# Patient Record
Sex: FEMALE | Race: WHITE | NOT HISPANIC OR LATINO | Employment: FULL TIME | URBAN - METROPOLITAN AREA
[De-identification: names, ages, dates, MRNs, and addresses within clinical notes are randomized per-mention and may not be internally consistent; named-entity substitution may affect disease eponyms.]

---

## 2023-08-09 DIAGNOSIS — F32.A DEPRESSION, UNSPECIFIED DEPRESSION TYPE: ICD-10-CM

## 2023-08-09 RX ORDER — SERTRALINE HYDROCHLORIDE 50 MG/1
50 TABLET, FILM COATED ORAL DAILY
COMMUNITY
Start: 2017-06-09 | End: 2023-08-09 | Stop reason: SDUPTHER

## 2023-08-09 RX ORDER — SERTRALINE HYDROCHLORIDE 50 MG/1
50 TABLET, FILM COATED ORAL DAILY
Qty: 30 TABLET | Refills: 0 | Status: SHIPPED | OUTPATIENT
Start: 2023-08-09 | End: 2023-10-17 | Stop reason: SDUPTHER

## 2023-10-09 DIAGNOSIS — B00.9 HSV-2 INFECTION: ICD-10-CM

## 2023-10-09 RX ORDER — ACYCLOVIR 800 MG/1
800 TABLET ORAL DAILY
Qty: 30 TABLET | Refills: 0 | Status: SHIPPED | OUTPATIENT
Start: 2023-10-09 | End: 2023-11-07 | Stop reason: ALTCHOICE

## 2023-10-09 RX ORDER — ACYCLOVIR 800 MG/1
1 TABLET ORAL DAILY
COMMUNITY
Start: 2020-02-13 | End: 2023-10-09 | Stop reason: SDUPTHER

## 2023-10-17 DIAGNOSIS — F32.A DEPRESSION, UNSPECIFIED DEPRESSION TYPE: ICD-10-CM

## 2023-10-17 RX ORDER — SERTRALINE HYDROCHLORIDE 50 MG/1
50 TABLET, FILM COATED ORAL DAILY
Qty: 30 TABLET | Refills: 0 | Status: SHIPPED | OUTPATIENT
Start: 2023-10-17 | End: 2023-11-07 | Stop reason: SDUPTHER

## 2023-11-07 ENCOUNTER — OFFICE VISIT (OUTPATIENT)
Dept: PRIMARY CARE | Facility: CLINIC | Age: 43
End: 2023-11-07
Payer: COMMERCIAL

## 2023-11-07 VITALS
SYSTOLIC BLOOD PRESSURE: 118 MMHG | DIASTOLIC BLOOD PRESSURE: 74 MMHG | WEIGHT: 178.4 LBS | HEIGHT: 69 IN | HEART RATE: 52 BPM | BODY MASS INDEX: 26.42 KG/M2

## 2023-11-07 DIAGNOSIS — M79.604 PAIN IN BOTH LOWER EXTREMITIES: Primary | ICD-10-CM

## 2023-11-07 DIAGNOSIS — M79.605 PAIN IN BOTH LOWER EXTREMITIES: Primary | ICD-10-CM

## 2023-11-07 DIAGNOSIS — B00.9 HSV-2 INFECTION: ICD-10-CM

## 2023-11-07 DIAGNOSIS — F32.A DEPRESSION, UNSPECIFIED DEPRESSION TYPE: ICD-10-CM

## 2023-11-07 PROBLEM — E55.9 VITAMIN D DEFICIENCY: Status: ACTIVE | Noted: 2023-11-07

## 2023-11-07 PROCEDURE — 99214 OFFICE O/P EST MOD 30 MIN: CPT | Performed by: STUDENT IN AN ORGANIZED HEALTH CARE EDUCATION/TRAINING PROGRAM

## 2023-11-07 RX ORDER — DOXYCYCLINE HYCLATE 20 MG
TABLET ORAL
COMMUNITY
Start: 2023-07-29

## 2023-11-07 RX ORDER — SPIRONOLACTONE 50 MG/1
50 TABLET, FILM COATED ORAL DAILY
COMMUNITY
Start: 2023-10-09

## 2023-11-07 RX ORDER — SERTRALINE HYDROCHLORIDE 50 MG/1
50 TABLET, FILM COATED ORAL DAILY
Qty: 90 TABLET | Refills: 1 | Status: SHIPPED | OUTPATIENT
Start: 2023-11-07

## 2023-11-07 RX ORDER — DROSPIRENONE AND ETHINYL ESTRADIOL 0.02-3(28)
1 KIT ORAL DAILY
COMMUNITY
Start: 2023-10-14

## 2023-11-07 RX ORDER — VALACYCLOVIR HYDROCHLORIDE 500 MG/1
500 TABLET, FILM COATED ORAL DAILY
Qty: 90 TABLET | Refills: 1 | Status: SHIPPED | OUTPATIENT
Start: 2023-11-07

## 2023-11-07 RX ORDER — ACYCLOVIR 800 MG/1
800 TABLET ORAL DAILY
Qty: 90 TABLET | Refills: 1 | Status: CANCELLED | OUTPATIENT
Start: 2023-11-07

## 2023-11-07 RX ORDER — TERCONAZOLE 4 MG/G
CREAM VAGINAL
COMMUNITY
Start: 2023-02-28

## 2023-11-07 NOTE — PROGRESS NOTES
Subjective   Patient ID: France Damico is a 43 y.o. female who presents for Depression and HSV Infection.  Today she is accompanied by alone.     HPI  1.  Depression  Continue sertraline 50 mg daily for her signs/symptoms  Denies any HI/SI  Feels very well and requesting refills    2.  HSV infection  Has a history in the past being diagnosed with HSV-2 with some flareups occurring in the past  Usually that this occurred almost monthly ready for her cycle started  Continues now acyclovir daily and denies any recent flareups  Requesting to discuss a different medication such as valacyclovir    3.  Vitamin D deficiency  Has a history of vitamin D deficiency but this was normal earlier this year    4.  Leg pain  Notices bilateral leg pain in regards to her anterior legs  Wishes to discuss this briefly at today's visit  Denies any trauma    Current Outpatient Medications on File Prior to Visit   Medication Sig Dispense Refill    doxycycline (Periostat) 20 mg tablet TAKE 2 TABLETS BY MOUTH EVERY DAY AS NEEDED FOR FLARES      drospirenone-ethinyl estradioL (Loly, Gianvi) 3-0.02 mg tablet Take 1 tablet by mouth once daily.      spironolactone (Aldactone) 50 mg tablet Take 1 tablet (50 mg) by mouth once daily.      terconazole (Terazol 7) 0.4 % vaginal cream USE 1 APPLICATION EVERY DAY AT BEDTIME FOR 7 DAYS.      [DISCONTINUED] acyclovir (Zovirax) 800 mg tablet Take 1 tablet (800 mg) by mouth once daily. 30 tablet 0    [DISCONTINUED] sertraline (Zoloft) 50 mg tablet Take 1 tablet (50 mg) by mouth once daily. 30 tablet 0     No current facility-administered medications on file prior to visit.        No Known Allergies    Immunization History   Administered Date(s) Administered    Moderna SARS-CoV-2 Vaccination 02/24/2021, 03/22/2021         Review of Systems  All pertinent positive symptoms are included in the history of present illness.  All other systems have been reviewed and are negative and noncontributory to this  "patient's current ailments.     Objective   /74 (BP Location: Left arm, Patient Position: Sitting, BP Cuff Size: Adult)   Pulse 52   Ht 1.74 m (5' 8.5\")   Wt 80.9 kg (178 lb 6.4 oz)   BMI 26.73 kg/m²   BSA: 1.98 meters squared  No visits with results within 1 Month(s) from this visit.   Latest known visit with results is:   [facility]acy Encounter on 02/16/2023   Component Date Value Ref Range Status    WBC 02/16/2023 4.1 (L)  4.4 - 11.3 x10E9/L Final    RBC 02/16/2023 4.21  4.00 - 5.20 x10E12/L Final    Hemoglobin 02/16/2023 13.3  12.0 - 16.0 g/dL Final    Hematocrit 02/16/2023 39.9  36.0 - 46.0 % Final    MCV 02/16/2023 95  80 - 100 fL Final    MCHC 02/16/2023 33.3  32.0 - 36.0 g/dL Final    Platelets 02/16/2023 264  150 - 450 x10E9/L Final    RDW 02/16/2023 12.1  11.5 - 14.5 % Final    Neutrophils % 02/16/2023 64.5  40.0 - 80.0 % Final    Immature Granulocytes %, Automated 02/16/2023 0.2  0.0 - 0.9 % Final    Comment:  Immature Granulocyte Count (IG) includes promyelocytes,    myelocytes and metamyelocytes but does not include bands.   Percent differential counts (%) should be interpreted in the   context of the absolute cell counts (cells/L).      Lymphocytes % 02/16/2023 25.7  13.0 - 44.0 % Final    Monocytes % 02/16/2023 6.4  2.0 - 10.0 % Final    Eosinophils % 02/16/2023 2.0  0.0 - 6.0 % Final    Basophils % 02/16/2023 1.2  0.0 - 2.0 % Final    Neutrophils Absolute 02/16/2023 2.61  1.20 - 7.70 x10E9/L Final    Lymphocytes Absolute 02/16/2023 1.04 (L)  1.20 - 4.80 x10E9/L Final    Monocytes Absolute 02/16/2023 0.26  0.10 - 1.00 x10E9/L Final    Eosinophils Absolute 02/16/2023 0.08  0.00 - 0.70 x10E9/L Final    Basophils Absolute 02/16/2023 0.05  0.00 - 0.10 x10E9/L Final    Glucose 02/16/2023 94  74 - 99 mg/dL Final    Sodium 02/16/2023 139  136 - 145 mmol/L Final    Potassium 02/16/2023 4.4  3.5 - 5.3 mmol/L Final    Chloride 02/16/2023 105  98 - 107 mmol/L Final    Bicarbonate 02/16/2023 27  21 - 32 " mmol/L Final    Anion Gap 02/16/2023 11  10 - 20 mmol/L Final    Urea Nitrogen 02/16/2023 11  6 - 23 mg/dL Final    Creatinine 02/16/2023 0.79  0.50 - 1.05 mg/dL Final    GFR Female 02/16/2023 >90  >90 mL/min/1.73m2 Final    Comment:  CALCULATIONS OF ESTIMATED GFR ARE PERFORMED   USING THE 2021 CKD-EPI STUDY REFIT EQUATION   WITHOUT THE RACE VARIABLE FOR THE IDMS-TRACEABLE   CREATININE METHODS.    https://jasn.asnjournals.org/content/early/2021/09/22/ASN.6981269567      Calcium 02/16/2023 9.2  8.6 - 10.3 mg/dL Final    Albumin 02/16/2023 4.4  3.4 - 5.0 g/dL Final    Alkaline Phosphatase 02/16/2023 63  33 - 110 U/L Final    Total Protein 02/16/2023 6.9  6.4 - 8.2 g/dL Final    AST 02/16/2023 17  9 - 39 U/L Final    Total Bilirubin 02/16/2023 1.2  0.0 - 1.2 mg/dL Final    ALT (SGPT) 02/16/2023 11  7 - 45 U/L Final    Comment:  Patients treated with Sulfasalazine may generate    falsely decreased results for ALT.      Vitamin D, 25-Hydroxy 02/16/2023 42  ng/mL Final    Comment: .  DEFICIENCY:         < 20   NG/ML  INSUFFICIENCY:      20-29  NG/ML  SUFFICIENCY:         NG/ML    THIS ASSAY ACCURATELY QUANTIFIES THE SUM OF  VITAMIN D3, 25-HYDROXY AND VIT D2,25-HYDROXY.      Hepatitis C Ab 02/16/2023 NONREACTIVE  NONREACTIVE Final    Comment:  Results from patients taking biotin supplements or receiving   high-dose biotin therapy should be interpreted with caution   due to possible interference with this test. Providers may    contact their local laboratory for further information.      Cholesterol 02/16/2023 181  0 - 199 mg/dL Final    Comment: .      AGE      DESIRABLE   BORDERLINE HIGH   HIGH     0-19 Y     0 - 169       170 - 199     >/= 200    20-24 Y     0 - 189       190 - 224     >/= 225         >24 Y     0 - 199       200 - 239     >/= 240   **All ranges are based on fasting samples. Specific   therapeutic targets will vary based on patient-specific   cardiac risk.  .   Pediatric guidelines  reference:Pediatrics 2011, 128(S5).   Adult guidelines reference: NCEP ATPIII Guidelines,     MIN 2001, 258:2486-97  .   Venipuncture immediately after or during the    administration of Metamizole may lead to falsely   low results. Testing should be performed immediately   prior to Metamizole dosing.      HDL 02/16/2023 60.6  mg/dL Final    Comment: .      AGE      VERY LOW   LOW     NORMAL    HIGH       0-19 Y       < 35   < 40     40-45     ----    20-24 Y       ----   < 40       >45     ----      >24 Y       ----   < 40     40-60      >60  .      Cholesterol/HDL Ratio 02/16/2023 3.0   Final    Comment: REF VALUES  DESIRABLE  < 3.4  HIGH RISK  > 5.0      LDL 02/16/2023 84  0 - 99 mg/dL Final    Comment: .                           NEAR      BORD      AGE      DESIRABLE  OPTIMAL    HIGH     HIGH     VERY HIGH     0-19 Y     0 - 109     ---    110-129   >/= 130     ----    20-24 Y     0 - 119     ---    120-159   >/= 160     ----      >24 Y     0 -  99   100-129  130-159   160-189     >/=190  .      VLDL 02/16/2023 37  0 - 40 mg/dL Final    Triglycerides 02/16/2023 183 (H)  0 - 149 mg/dL Final    Comment: .      AGE      DESIRABLE   BORDERLINE HIGH   HIGH     VERY HIGH   0 D-90 D    19 - 174         ----         ----        ----  91 D- 9 Y     0 -  74        75 -  99     >/= 100      ----    10-19 Y     0 -  89        90 - 129     >/= 130      ----    20-24 Y     0 - 114       115 - 149     >/= 150      ----         >24 Y     0 - 149       150 - 199    200- 499    >/= 500  .   Venipuncture immediately after or during the    administration of Metamizole may lead to falsely   low results. Testing should be performed immediately   prior to Metamizole dosing.      TSH 02/16/2023 2.01  0.44 - 3.98 mIU/L Final    Comment:  TSH testing is performed using different testing    methodology at [facility] than at other    [facility]. Direct result comparisons should    only be made within the same  method.         Physical Exam  CONSTITUTIONAL - well nourished, well developed, looks like stated age, in no acute distress, not ill-appearing, and not tired appearing  SKIN - normal skin color and pigmentation, normal skin turgor without rash, lesions, or nodules visualized  HEAD - no trauma, normocephalic  EYES - normal external exam  CHEST -no distressed breathing, good effort  EXTREMITIES - no edema, no deformities  NEUROLOGICAL - normal balance, normal motor, no ataxia  PSYCHIATRIC - alert, pleasant and cordial, age-appropriate    Assessment/Plan   1.  Depression  Stable.  No change recommended this time  We will continue sertraline at 50 mg daily  Refill sent to your pharmacy    2.  HSV infection  It was discussed that we change you on valacyclovir  Continue this medication at 500 mg daily  Please notify me if any flares occur    3.  Vitamin D deficiency  We will continue monitoring yearly    4.  Leg pain  I recommend you increase your water intake and decrease any excessive alcohol/caffeine  It might be even wise to discuss lab work if this continues to be an issue

## 2023-12-07 ENCOUNTER — HOSPITAL ENCOUNTER (OUTPATIENT)
Dept: RADIOLOGY | Facility: HOSPITAL | Age: 43
Discharge: HOME | End: 2023-12-07
Payer: COMMERCIAL

## 2023-12-07 DIAGNOSIS — Z12.31 ENCOUNTER FOR SCREENING MAMMOGRAM FOR MALIGNANT NEOPLASM OF BREAST: ICD-10-CM

## 2023-12-07 PROCEDURE — 77063 BREAST TOMOSYNTHESIS BI: CPT | Mod: BILATERAL PROCEDURE | Performed by: RADIOLOGY

## 2023-12-07 PROCEDURE — 77067 SCR MAMMO BI INCL CAD: CPT | Mod: BILATERAL PROCEDURE | Performed by: RADIOLOGY

## 2023-12-07 PROCEDURE — 77067 SCR MAMMO BI INCL CAD: CPT

## 2024-05-02 ENCOUNTER — LAB REQUISITION (OUTPATIENT)
Dept: LAB | Facility: HOSPITAL | Age: 44
End: 2024-05-02
Payer: COMMERCIAL

## 2024-05-02 LAB — SARS-COV-2 RNA RESP QL NAA+PROBE: DETECTED

## 2024-05-02 PROCEDURE — 87635 SARS-COV-2 COVID-19 AMP PRB: CPT

## 2024-05-07 ENCOUNTER — APPOINTMENT (OUTPATIENT)
Dept: PRIMARY CARE | Facility: CLINIC | Age: 44
End: 2024-05-07
Payer: COMMERCIAL

## 2024-06-09 DIAGNOSIS — F32.A DEPRESSION, UNSPECIFIED DEPRESSION TYPE: ICD-10-CM

## 2024-06-09 DIAGNOSIS — B00.9 HSV-2 INFECTION: ICD-10-CM

## 2024-06-10 ENCOUNTER — APPOINTMENT (OUTPATIENT)
Dept: PRIMARY CARE | Facility: CLINIC | Age: 44
End: 2024-06-10
Payer: COMMERCIAL

## 2024-06-11 RX ORDER — SERTRALINE HYDROCHLORIDE 50 MG/1
50 TABLET, FILM COATED ORAL DAILY
Qty: 90 TABLET | Refills: 0 | Status: SHIPPED | OUTPATIENT
Start: 2024-06-11

## 2024-06-11 RX ORDER — VALACYCLOVIR HYDROCHLORIDE 500 MG/1
500 TABLET, FILM COATED ORAL DAILY
Qty: 90 TABLET | Refills: 0 | Status: SHIPPED | OUTPATIENT
Start: 2024-06-11

## 2024-06-22 ENCOUNTER — LAB REQUISITION (OUTPATIENT)
Dept: LAB | Facility: HOSPITAL | Age: 44
End: 2024-06-22
Payer: COMMERCIAL

## 2024-06-22 DIAGNOSIS — J02.9 ACUTE PHARYNGITIS, UNSPECIFIED: ICD-10-CM

## 2024-06-22 PROCEDURE — 87651 STREP A DNA AMP PROBE: CPT

## 2024-06-23 LAB — S PYO DNA THROAT QL NAA+PROBE: NOT DETECTED

## 2024-08-22 ENCOUNTER — APPOINTMENT (OUTPATIENT)
Dept: PRIMARY CARE | Facility: CLINIC | Age: 44
End: 2024-08-22
Payer: COMMERCIAL

## 2024-08-22 VITALS
DIASTOLIC BLOOD PRESSURE: 80 MMHG | BODY MASS INDEX: 25.77 KG/M2 | HEIGHT: 69 IN | HEART RATE: 69 BPM | OXYGEN SATURATION: 99 % | SYSTOLIC BLOOD PRESSURE: 112 MMHG | WEIGHT: 174 LBS

## 2024-08-22 DIAGNOSIS — M79.604 RIGHT LEG PAIN: ICD-10-CM

## 2024-08-22 DIAGNOSIS — L71.9 ROSACEA: ICD-10-CM

## 2024-08-22 DIAGNOSIS — F32.A DEPRESSION, UNSPECIFIED DEPRESSION TYPE: ICD-10-CM

## 2024-08-22 DIAGNOSIS — Z12.31 ENCOUNTER FOR SCREENING MAMMOGRAM FOR MALIGNANT NEOPLASM OF BREAST: ICD-10-CM

## 2024-08-22 DIAGNOSIS — Z00.00 HEALTHCARE MAINTENANCE: Primary | ICD-10-CM

## 2024-08-22 DIAGNOSIS — M54.50 RIGHT LOW BACK PAIN, UNSPECIFIED CHRONICITY, UNSPECIFIED WHETHER SCIATICA PRESENT: ICD-10-CM

## 2024-08-22 DIAGNOSIS — B00.9 HSV-2 INFECTION: ICD-10-CM

## 2024-08-22 DIAGNOSIS — E55.9 VITAMIN D DEFICIENCY: ICD-10-CM

## 2024-08-22 PROCEDURE — 1036F TOBACCO NON-USER: CPT | Performed by: STUDENT IN AN ORGANIZED HEALTH CARE EDUCATION/TRAINING PROGRAM

## 2024-08-22 PROCEDURE — 3008F BODY MASS INDEX DOCD: CPT | Performed by: STUDENT IN AN ORGANIZED HEALTH CARE EDUCATION/TRAINING PROGRAM

## 2024-08-22 PROCEDURE — 99396 PREV VISIT EST AGE 40-64: CPT | Performed by: STUDENT IN AN ORGANIZED HEALTH CARE EDUCATION/TRAINING PROGRAM

## 2024-08-22 PROCEDURE — 99214 OFFICE O/P EST MOD 30 MIN: CPT | Performed by: STUDENT IN AN ORGANIZED HEALTH CARE EDUCATION/TRAINING PROGRAM

## 2024-08-22 RX ORDER — SERTRALINE HYDROCHLORIDE 50 MG/1
50 TABLET, FILM COATED ORAL DAILY
Qty: 90 TABLET | Refills: 1 | Status: SHIPPED | OUTPATIENT
Start: 2024-08-22

## 2024-08-22 RX ORDER — VALACYCLOVIR HYDROCHLORIDE 500 MG/1
500 TABLET, FILM COATED ORAL DAILY
Qty: 90 TABLET | Refills: 1 | Status: SHIPPED | OUTPATIENT
Start: 2024-08-22

## 2024-08-22 RX ORDER — SPIRONOLACTONE 50 MG/1
50 TABLET, FILM COATED ORAL DAILY
Qty: 90 TABLET | Refills: 1 | Status: SHIPPED | OUTPATIENT
Start: 2024-08-22

## 2024-08-22 ASSESSMENT — PATIENT HEALTH QUESTIONNAIRE - PHQ9
2. FEELING DOWN, DEPRESSED OR HOPELESS: NOT AT ALL
1. LITTLE INTEREST OR PLEASURE IN DOING THINGS: NOT AT ALL
SUM OF ALL RESPONSES TO PHQ9 QUESTIONS 1 AND 2: 0

## 2024-08-22 NOTE — PROGRESS NOTES
Subjective   Patient ID: France Damico is a 44 y.o. female who presents for Annual Exam.  Today she is accompanied by accompanied by child.     HPI  Health Maintenance   Overall patient is doing well.   Immunization: Tdap 2023   COVID-19 vaccine UTD  Colon Cancer Screening: No family history  OB/GYN:  mammogram 12/2023 due this year  Diet: attempts to eat a well balanced diet   Exercise: attempts to exercise regularly   Tobacco: Denies use  EtOH: Rarely Socially     2.  Depression  Continue sertraline 50 mg daily for her signs/symptoms  Denies any HI/SI  Feels very well and requesting refills     3.  HSV infection  Has a history in the past being diagnosed with HSV-2 with some flareups occurring in the past  Usually that this occurred almost monthly ready for her cycle started  Continues now acyclovir daily and denies any recent flareups  Requesting refills     4.  Vitamin D deficiency  Has a history of vitamin D deficiency but within normal limits at 42 at lab work done 2/16/2023     5.  Leg pain  She has been having anterior leg pain on her right leg for the last year   She thinks this is caused by her back pain that she thinks was caused by lifting heavy wood  She is also having some right anterior hip pain   She describes the pain as throbbing/achy   Has tried stretching but has not helped   Wishes to discuss this further     6. Rosacea   Patient has been seeing dermatology for management of rosacea   But has been unable to find time to go to an appointment   Is managed with 50 mg spironolactone once daily   Requesting we take over this prescription         Current Outpatient Medications on File Prior to Visit   Medication Sig Dispense Refill    drospirenone-ethinyl estradioL (Loly, Gianvi) 3-0.02 mg tablet Take 1 tablet by mouth once daily.      terconazole (Terazol 7) 0.4 % vaginal cream USE 1 APPLICATION EVERY DAY AT BEDTIME FOR 7 DAYS.      [DISCONTINUED] doxycycline (Periostat) 20 mg tablet TAKE 2  "TABLETS BY MOUTH EVERY DAY AS NEEDED FOR FLARES      [DISCONTINUED] sertraline (Zoloft) 50 mg tablet TAKE ONE TABLET BY MOUTH ONCE DAILY 90 tablet 0    [DISCONTINUED] spironolactone (Aldactone) 50 mg tablet Take 1 tablet (50 mg) by mouth once daily.      [DISCONTINUED] valACYclovir (Valtrex) 500 mg tablet TAKE ONE TABLET BY MOUTH ONCE DAILY 90 tablet 0     No current facility-administered medications on file prior to visit.        No Known Allergies    Immunization History   Administered Date(s) Administered    Flu vaccine (IIV4), preservative free *Check age/dose* 10/14/2016    Hepatitis B vaccine, adult *Check Product/Dose* 07/27/2004, 08/27/2004, 12/27/2004    Influenza, live, intranasal 10/24/2006    MMR vaccine, subcutaneous (MMR II) 04/09/1992, 09/19/2023    Moderna SARS-CoV-2 Vaccination 02/24/2021, 03/22/2021    Novel influenza-H1N1-09, preservative-free 12/21/2009    PPD Test 07/27/2004, 08/03/2004    Td vaccine, age 7 years and older (TDVAX) 07/27/2004         Review of Systems  All pertinent positive symptoms are included in the history of present illness.  All other systems have been reviewed and are negative and noncontributory to this patient's current ailments.     Objective   /80 (BP Location: Left arm, Patient Position: Sitting, BP Cuff Size: Adult)   Pulse 69   Ht 1.74 m (5' 8.5\")   Wt 78.9 kg (174 lb)   SpO2 99%   BMI 26.07 kg/m²   BSA: 1.95 meters squared  No visits with results within 1 Month(s) from this visit.   Latest known visit with results is:   Lab Requisition on 06/22/2024   Component Date Value Ref Range Status    Group A Strep PCR 06/22/2024 Not Detected  Not Detected Final    This assay is an FDA-cleared, real-time PCR test for the qualitative detection of Group A Streptococcus bacterial DNA from throat swabs that have not undergone a nucleic acid extraction. Negative results do not require confirmation by culture.        Physical Exam  CONSTITUTIONAL - well nourished, well " developed, looks like stated age, in no acute distress, not ill-appearing, and not tired appearing  SKIN - normal skin color and pigmentation, normal skin turgor without rash, lesions, or nodules visualized  HEAD - no trauma, normocephalic  EYES - normal external exam  CHEST - clear to auscultation, no wheezing, no crackles and no rales, good effort  CARDIAC - regular rate and regular rhythm, no skipped beats, no murmur  EXTREMITIES - no edema, no deformities  NEUROLOGICAL - normal balance, normal motor, no ataxia  PSYCHIATRIC - alert, pleasant and cordial, age-appropriate      Assessment/Plan   Health Maintenance   Complete history and physical examination was performed  EKG was not done at this visit   Fasting blood work was ordered please get this done at your earliest convenience   We will notify you of test results once available and make treatment recommendations accordingly  Please continue following up with your OB/GYN for well woman needs  A requisition for screening mammogram was provided to you today to be done at your earliest mutual convenience  Please attempt to maintain a well-balanced diet and exercise regularly      2.  Depression  Stable.  No change recommended this time  We will continue sertraline at 50 mg daily  Refill sent to your pharmacy     3  HSV infection  Stable at this time  We will continue valacyclovir 500 mg   Refills sent to your pharmacy     4.  Vitamin D deficiency  We will continue monitoring yearly     5. Right leg pain  We discussed your right leg pain at length  We made an order for you to get a X-Ray done at your earliest convenience   We also made a referral to PT for you to start seeing     6. Rosacea   Refills of spironolactone were sent to your pharmacy     If you have any questions/concerns, please call our office.   Otherwise, we will see you at your next visit.

## 2024-08-29 ENCOUNTER — LAB (OUTPATIENT)
Dept: LAB | Facility: LAB | Age: 44
End: 2024-08-29
Payer: COMMERCIAL

## 2024-08-29 ENCOUNTER — HOSPITAL ENCOUNTER (OUTPATIENT)
Dept: RADIOLOGY | Facility: HOSPITAL | Age: 44
Discharge: HOME | End: 2024-08-29
Payer: COMMERCIAL

## 2024-08-29 DIAGNOSIS — Z00.00 HEALTHCARE MAINTENANCE: ICD-10-CM

## 2024-08-29 DIAGNOSIS — M79.604 RIGHT LEG PAIN: ICD-10-CM

## 2024-08-29 DIAGNOSIS — M54.50 RIGHT LOW BACK PAIN, UNSPECIFIED CHRONICITY, UNSPECIFIED WHETHER SCIATICA PRESENT: ICD-10-CM

## 2024-08-29 LAB
ALBUMIN SERPL BCP-MCNC: 4.4 G/DL (ref 3.4–5)
ALP SERPL-CCNC: 62 U/L (ref 33–110)
ALT SERPL W P-5'-P-CCNC: 16 U/L (ref 7–45)
ANION GAP SERPL CALC-SCNC: 13 MMOL/L (ref 10–20)
AST SERPL W P-5'-P-CCNC: 18 U/L (ref 9–39)
BASOPHILS # BLD AUTO: 0.03 X10*3/UL (ref 0–0.1)
BASOPHILS NFR BLD AUTO: 0.6 %
BILIRUB SERPL-MCNC: 1 MG/DL (ref 0–1.2)
BUN SERPL-MCNC: 10 MG/DL (ref 6–23)
CALCIUM SERPL-MCNC: 9.2 MG/DL (ref 8.6–10.3)
CHLORIDE SERPL-SCNC: 105 MMOL/L (ref 98–107)
CHOLEST SERPL-MCNC: 218 MG/DL (ref 0–199)
CHOLESTEROL/HDL RATIO: 3.3
CO2 SERPL-SCNC: 25 MMOL/L (ref 21–32)
CREAT SERPL-MCNC: 0.64 MG/DL (ref 0.5–1.05)
EGFRCR SERPLBLD CKD-EPI 2021: >90 ML/MIN/1.73M*2
EOSINOPHIL # BLD AUTO: 0.11 X10*3/UL (ref 0–0.7)
EOSINOPHIL NFR BLD AUTO: 2.2 %
ERYTHROCYTE [DISTWIDTH] IN BLOOD BY AUTOMATED COUNT: 12.2 % (ref 11.5–14.5)
EST. AVERAGE GLUCOSE BLD GHB EST-MCNC: 85 MG/DL
GLUCOSE SERPL-MCNC: 97 MG/DL (ref 74–99)
HBA1C MFR BLD: 4.6 %
HCT VFR BLD AUTO: 40 % (ref 36–46)
HDLC SERPL-MCNC: 65.9 MG/DL
HGB BLD-MCNC: 13.2 G/DL (ref 12–16)
HIV 1+2 AB+HIV1 P24 AG SERPL QL IA: NONREACTIVE
IMM GRANULOCYTES # BLD AUTO: 0.01 X10*3/UL (ref 0–0.7)
IMM GRANULOCYTES NFR BLD AUTO: 0.2 % (ref 0–0.9)
LDLC SERPL CALC-MCNC: 128 MG/DL
LYMPHOCYTES # BLD AUTO: 1.21 X10*3/UL (ref 1.2–4.8)
LYMPHOCYTES NFR BLD AUTO: 24.7 %
MCH RBC QN AUTO: 31.5 PG (ref 26–34)
MCHC RBC AUTO-ENTMCNC: 33 G/DL (ref 32–36)
MCV RBC AUTO: 96 FL (ref 80–100)
MONOCYTES # BLD AUTO: 0.34 X10*3/UL (ref 0.1–1)
MONOCYTES NFR BLD AUTO: 6.9 %
NEUTROPHILS # BLD AUTO: 3.2 X10*3/UL (ref 1.2–7.7)
NEUTROPHILS NFR BLD AUTO: 65.4 %
NON HDL CHOLESTEROL: 152 MG/DL (ref 0–149)
NRBC BLD-RTO: 0 /100 WBCS (ref 0–0)
PLATELET # BLD AUTO: 259 X10*3/UL (ref 150–450)
POTASSIUM SERPL-SCNC: 5 MMOL/L (ref 3.5–5.3)
PROT SERPL-MCNC: 6.7 G/DL (ref 6.4–8.2)
RBC # BLD AUTO: 4.19 X10*6/UL (ref 4–5.2)
SODIUM SERPL-SCNC: 138 MMOL/L (ref 136–145)
TRIGL SERPL-MCNC: 123 MG/DL (ref 0–149)
TSH SERPL-ACNC: 2.03 MIU/L (ref 0.44–3.98)
VLDL: 25 MG/DL (ref 0–40)
WBC # BLD AUTO: 4.9 X10*3/UL (ref 4.4–11.3)

## 2024-08-29 PROCEDURE — 73502 X-RAY EXAM HIP UNI 2-3 VIEWS: CPT | Mod: RT

## 2024-08-29 PROCEDURE — 36415 COLL VENOUS BLD VENIPUNCTURE: CPT

## 2024-08-29 PROCEDURE — 83036 HEMOGLOBIN GLYCOSYLATED A1C: CPT

## 2024-08-29 PROCEDURE — 87389 HIV-1 AG W/HIV-1&-2 AB AG IA: CPT

## 2024-08-29 PROCEDURE — 72110 X-RAY EXAM L-2 SPINE 4/>VWS: CPT

## 2024-08-29 PROCEDURE — 80061 LIPID PANEL: CPT

## 2024-08-29 PROCEDURE — 80053 COMPREHEN METABOLIC PANEL: CPT

## 2024-08-29 PROCEDURE — 85025 COMPLETE CBC W/AUTO DIFF WBC: CPT

## 2024-08-29 PROCEDURE — 84443 ASSAY THYROID STIM HORMONE: CPT

## 2024-08-30 NOTE — RESULT ENCOUNTER NOTE
Cholesterol did increase now up to 218, HDL 65, , triglycerides 123  I recommend diet and exercise at this time    HIV negative    Hemoglobin A1c within normal limits at 4.6%    Thyroid within normal limits    Sugar, kidneys, liver, electrolytes are all within normal limits    Complete blood cell count shows no anemia

## 2024-09-27 DIAGNOSIS — L71.9 ROSACEA: ICD-10-CM

## 2024-09-27 RX ORDER — DOXYCYCLINE HYCLATE 20 MG
20 TABLET ORAL DAILY
Qty: 90 TABLET | Refills: 0 | Status: SHIPPED | OUTPATIENT
Start: 2024-09-27

## 2024-09-27 RX ORDER — DOXYCYCLINE 100 MG/1
100 CAPSULE ORAL DAILY
Qty: 7 CAPSULE | Refills: 0 | Status: SHIPPED | OUTPATIENT
Start: 2024-09-27 | End: 2024-10-04

## 2024-11-18 ENCOUNTER — OFFICE VISIT (OUTPATIENT)
Dept: PRIMARY CARE | Facility: CLINIC | Age: 44
End: 2024-11-18
Payer: COMMERCIAL

## 2024-11-18 VITALS
SYSTOLIC BLOOD PRESSURE: 106 MMHG | DIASTOLIC BLOOD PRESSURE: 70 MMHG | TEMPERATURE: 98.3 F | OXYGEN SATURATION: 98 % | HEART RATE: 80 BPM

## 2024-11-18 DIAGNOSIS — R52 BODY ACHES: ICD-10-CM

## 2024-11-18 DIAGNOSIS — J06.9 ACUTE URI: Primary | ICD-10-CM

## 2024-11-18 DIAGNOSIS — R05.1 ACUTE COUGH: ICD-10-CM

## 2024-11-18 PROCEDURE — 1036F TOBACCO NON-USER: CPT | Performed by: STUDENT IN AN ORGANIZED HEALTH CARE EDUCATION/TRAINING PROGRAM

## 2024-11-18 PROCEDURE — 99214 OFFICE O/P EST MOD 30 MIN: CPT | Performed by: STUDENT IN AN ORGANIZED HEALTH CARE EDUCATION/TRAINING PROGRAM

## 2024-11-18 RX ORDER — GUAIFENESIN/DEXTROMETHORPHAN 100-10MG/5
10 SYRUP ORAL 3 TIMES DAILY PRN
Qty: 118 ML | Refills: 0 | Status: SHIPPED | OUTPATIENT
Start: 2024-11-18 | End: 2024-11-28

## 2024-11-18 RX ORDER — AMOXICILLIN AND CLAVULANATE POTASSIUM 875; 125 MG/1; MG/1
875 TABLET, FILM COATED ORAL 2 TIMES DAILY
Qty: 20 TABLET | Refills: 0 | Status: SHIPPED | OUTPATIENT
Start: 2024-11-18 | End: 2024-11-28

## 2024-11-18 ASSESSMENT — ENCOUNTER SYMPTOMS
COUGH: 1
FEVER: 1
CHILLS: 1
SWEATS: 1
WHEEZING: 1

## 2024-11-18 ASSESSMENT — PATIENT HEALTH QUESTIONNAIRE - PHQ9
2. FEELING DOWN, DEPRESSED OR HOPELESS: NOT AT ALL
SUM OF ALL RESPONSES TO PHQ9 QUESTIONS 1 AND 2: 0
1. LITTLE INTEREST OR PLEASURE IN DOING THINGS: NOT AT ALL

## 2024-11-18 NOTE — PROGRESS NOTES
Subjective   Patient ID: France Damico is a 44 y.o. female who presents for Cough and Generalized Body Aches.    Cough  This is a new problem. The current episode started in the past 7 days. The problem has been gradually improving. The problem occurs every few minutes. The cough is Productive of sputum. Associated symptoms include chills, a fever, sweats and wheezing. The symptoms are aggravated by lying down. Risk factors for lung disease include occupational exposure. There is no history of asthma.   Also patient complains of being tired and weak.  Denies any chest pain, shortness of breath, denies any lightheadedness or balance problem.  Denies any otalgia, sore throat, rhinorrhea or sinus pressure  Did in-home COVID test yesterday which was negative    Review of Systems   Constitutional:  Positive for chills and fever.   Respiratory:  Positive for cough and wheezing.        Objective   /70 (BP Location: Left arm, Patient Position: Sitting, BP Cuff Size: Adult)   Pulse 80   Temp 36.8 °C (98.3 °F)   SpO2 98%     Physical Exam  Constitutional:       General: She is not in acute distress.     Appearance: Normal appearance. She is normal weight. She is not ill-appearing.   HENT:      Head: Normocephalic and atraumatic.      Right Ear: External ear normal.      Left Ear: External ear normal.      Nose: Nose normal. No congestion or rhinorrhea.      Mouth/Throat:      Mouth: Mucous membranes are moist.      Pharynx: Oropharynx is clear. No oropharyngeal exudate or posterior oropharyngeal erythema.   Eyes:      General: No scleral icterus.     Extraocular Movements: Extraocular movements intact.      Conjunctiva/sclera: Conjunctivae normal.      Pupils: Pupils are equal, round, and reactive to light.   Cardiovascular:      Rate and Rhythm: Normal rate and regular rhythm.      Pulses: Normal pulses.      Heart sounds: Normal heart sounds. No murmur heard.  Pulmonary:      Effort: Pulmonary effort is normal.  No respiratory distress.      Breath sounds: Wheezing, rhonchi and rales present.      Comments: There is and inspiratory wheezing and rales, as well as occasional rhonchi scattered mostly on the basis of the lungs  Abdominal:      General: Abdomen is flat. Bowel sounds are normal.      Palpations: Abdomen is soft.      Tenderness: There is no abdominal tenderness. There is no guarding or rebound.   Musculoskeletal:         General: No deformity. Normal range of motion.      Right lower leg: No edema.      Left lower leg: No edema.   Skin:     General: Skin is warm and dry.      Capillary Refill: Capillary refill takes less than 2 seconds.      Findings: No lesion or rash.   Neurological:      General: No focal deficit present.      Mental Status: She is alert and oriented to person, place, and time. Mental status is at baseline.   Psychiatric:         Mood and Affect: Mood normal.         Behavior: Behavior normal.         Assessment/Plan   Diagnoses and all orders for this visit:  Acute URI  Acute cough  Body aches  -     amoxicillin-pot clavulanate (Augmentin) 875-125 mg tablet; Take 1 tablet (875 mg) by mouth 2 times a day for 10 days.  -     dextromethorphan-guaifenesin (Robitussin DM)  mg/5 mL oral liquid; Take 10 mL by mouth 3 times a day as needed for cough for up to 10 days.  -     XR chest 2 views; Future  Clinical presentation and physical exam are more consistent with concerns of community-acquired pneumonia.  Differential diagnosis include viral URI with mainly culprit being influenza virus since in-home COVID test was negative, considering false negatives as well.  Patient already taking doxycycline for her rosacea  We will prescribe a course of p.o. Augmentin for 10 days alongside with Robitussin.  Chest x-ray was ordered to confirm diagnosis, rule out other intrathoracic abnormalities.  Instructed to consume plenty of fluids and use Tylenol/Motrin as needed for pain/fever.  Educated to go to  the closest emergency department if at any point patient will experience increased shortness of breath or chest pain.    Thank you for letting us be a part of your care team.  Please call the office if you have further questions or concerns regarding your care    Grace Abdi MD  PGY2,  Resident

## 2025-03-25 ENCOUNTER — HOSPITAL ENCOUNTER (OUTPATIENT)
Dept: RADIOLOGY | Facility: HOSPITAL | Age: 45
Discharge: HOME | End: 2025-03-25
Payer: COMMERCIAL

## 2025-03-25 VITALS — WEIGHT: 174 LBS | BODY MASS INDEX: 25.77 KG/M2 | HEIGHT: 69 IN

## 2025-03-25 DIAGNOSIS — Z12.31 ENCOUNTER FOR SCREENING MAMMOGRAM FOR MALIGNANT NEOPLASM OF BREAST: ICD-10-CM

## 2025-03-25 PROCEDURE — 77067 SCR MAMMO BI INCL CAD: CPT

## 2025-03-25 PROCEDURE — 77063 BREAST TOMOSYNTHESIS BI: CPT | Performed by: RADIOLOGY

## 2025-03-25 PROCEDURE — 77067 SCR MAMMO BI INCL CAD: CPT | Performed by: RADIOLOGY

## 2025-04-01 ENCOUNTER — APPOINTMENT (OUTPATIENT)
Dept: PRIMARY CARE | Facility: CLINIC | Age: 45
End: 2025-04-01
Payer: COMMERCIAL

## 2025-04-01 VITALS
WEIGHT: 178.6 LBS | BODY MASS INDEX: 26.76 KG/M2 | OXYGEN SATURATION: 97 % | DIASTOLIC BLOOD PRESSURE: 74 MMHG | HEART RATE: 64 BPM | SYSTOLIC BLOOD PRESSURE: 112 MMHG

## 2025-04-01 DIAGNOSIS — Z13.1 SCREENING FOR DIABETES MELLITUS: ICD-10-CM

## 2025-04-01 DIAGNOSIS — Z13.29 SCREENING FOR THYROID DISORDER: ICD-10-CM

## 2025-04-01 DIAGNOSIS — E55.9 VITAMIN D DEFICIENCY: ICD-10-CM

## 2025-04-01 DIAGNOSIS — Z13.6 SCREENING FOR CARDIOVASCULAR CONDITION: ICD-10-CM

## 2025-04-01 DIAGNOSIS — Z13.0 SCREENING FOR BLOOD DISEASE: ICD-10-CM

## 2025-04-01 DIAGNOSIS — L71.9 ROSACEA: ICD-10-CM

## 2025-04-01 DIAGNOSIS — L65.9 HAIR LOSS: ICD-10-CM

## 2025-04-01 DIAGNOSIS — F32.A DEPRESSION, UNSPECIFIED DEPRESSION TYPE: Primary | ICD-10-CM

## 2025-04-01 DIAGNOSIS — Z12.11 ENCOUNTER FOR SCREENING COLONOSCOPY: ICD-10-CM

## 2025-04-01 PROCEDURE — 1036F TOBACCO NON-USER: CPT | Performed by: STUDENT IN AN ORGANIZED HEALTH CARE EDUCATION/TRAINING PROGRAM

## 2025-04-01 PROCEDURE — 99214 OFFICE O/P EST MOD 30 MIN: CPT | Performed by: STUDENT IN AN ORGANIZED HEALTH CARE EDUCATION/TRAINING PROGRAM

## 2025-04-01 RX ORDER — BUPROPION HYDROCHLORIDE 150 MG/1
150 TABLET ORAL EVERY MORNING
Qty: 30 TABLET | Refills: 1 | Status: SHIPPED | OUTPATIENT
Start: 2025-04-01 | End: 2025-05-31

## 2025-04-01 RX ORDER — SPIRONOLACTONE 100 MG/1
100 TABLET, FILM COATED ORAL DAILY
Qty: 90 TABLET | Refills: 1 | Status: SHIPPED | OUTPATIENT
Start: 2025-04-01

## 2025-04-01 NOTE — PROGRESS NOTES
Subjective   Patient ID: France Damico is a 45 y.o. female who presents for Alopecia.  Today she is accompanied by alone.     HPI    Depression  Was previously on sertraline 50 mg daily for her signs/symptoms, patient states that she has stopped discontinue herself from an about 1.5 months ago due to issues with libido.  Since then patient does feel she needs to be on a medication for depression, has been doing research and would like to discuss Wellbutrin today.  Patient denies any history of any anxiety, seizures, eating disorders.    2. Hair Loss/rosacea/vitamin D deficiency  Has been previously losing hair, but started to notice it more consistently in October.  States that now people are starting to notice that she has hair loss and that it is thinning.  Notes that she has been sick quite a few times this past year with pneumonia and COVID, and understands that that may be playing a role.  Wishing to discuss possible medications as well as blood work that can be done to help evaluate and treat her hair loss.  Patient does take spironolactone 50 mg once daily for her rosacea with improvement.  In addition has been known to have vitamin D deficiency in the past which associated with hair loss.            Current Outpatient Medications on File Prior to Visit   Medication Sig Dispense Refill    doxycycline (Periostat) 20 mg tablet Take 1 tablet (20 mg) by mouth once daily. Take with a full glass of water and do not lie down for at least 30 minutes after. 90 tablet 0    terconazole (Terazol 7) 0.4 % vaginal cream USE 1 APPLICATION EVERY DAY AT BEDTIME FOR 7 DAYS.      valACYclovir (Valtrex) 500 mg tablet Take 1 tablet (500 mg) by mouth once daily. 90 tablet 1    [DISCONTINUED] sertraline (Zoloft) 50 mg tablet Take 1 tablet (50 mg) by mouth once daily. 90 tablet 1    [DISCONTINUED] spironolactone (Aldactone) 50 mg tablet Take 1 tablet (50 mg) by mouth once daily. 90 tablet 1     No current facility-administered  medications on file prior to visit.        No Known Allergies    Immunization History   Administered Date(s) Administered    Flu vaccine (IIV4), preservative free *Check age/dose* 10/14/2016    Hepatitis B vaccine, adult *Check Product/Dose* 07/27/2004, 08/27/2004, 12/27/2004    Influenza, live, intranasal 10/24/2006    MMR vaccine, subcutaneous (MMR II) 04/09/1992, 09/19/2023    Moderna SARS-CoV-2 Vaccination 02/24/2021, 03/22/2021    Novel influenza-H1N1-09, preservative-free 12/21/2009    PPD Test 07/27/2004, 08/03/2004    Td vaccine, age 7 years and older (TDVAX) 07/27/2004         Review of Systems  All pertinent positive symptoms are included in the history of present illness.  All other systems have been reviewed and are negative and noncontributory to this patient's current ailments.     Objective   /74 (BP Location: Left arm, Patient Position: Sitting, BP Cuff Size: Adult)   Pulse 64   Wt 81 kg (178 lb 9.6 oz)   SpO2 97%   BMI 26.76 kg/m²   BSA: 1.98 meters squared  No visits with results within 1 Month(s) from this visit.   Latest known visit with results is:   Lab on 08/29/2024   Component Date Value Ref Range Status    Thyroid Stimulating Hormone 08/29/2024 2.03  0.44 - 3.98 mIU/L Final    Cholesterol 08/29/2024 218 (H)  0 - 199 mg/dL Final          Age      Desirable   Borderline High   High     0-19 Y     0 - 169       170 - 199     >/= 200    20-24 Y     0 - 189       190 - 224     >/= 225         >24 Y     0 - 199       200 - 239     >/= 240   **All ranges are based on fasting samples. Specific   therapeutic targets will vary based on patient-specific   cardiac risk.    Pediatric guidelines reference:Pediatrics 2011, 128(S5).Adult guidelines reference: NCEP ATPIII Guidelines,MIN 2001, 258:2486-97    Venipuncture immediately after or during the administration of Metamizole may lead to falsely low results. Testing should be performed immediately prior to Metamizole dosing.    HDL-Cholesterol  08/29/2024 65.9  mg/dL Final      Age       Very Low   Low     Normal    High    0-19 Y    < 35      < 40     40-45     ----  20-24 Y    ----     < 40      >45      ----        >24 Y      ----     < 40     40-60      >60      Cholesterol/HDL Ratio 08/29/2024 3.3   Final      Ref Values  Desirable  < 3.4  High Risk  > 5.0    LDL Calculated 08/29/2024 128 (H)  <=99 mg/dL Final                                Near   Borderline      AGE      Desirable  Optimal    High     High     Very High     0-19 Y     0 - 109     ---    110-129   >/= 130     ----    20-24 Y     0 - 119     ---    120-159   >/= 160     ----      >24 Y     0 -  99   100-129  130-159   160-189     >/=190      VLDL 08/29/2024 25  0 - 40 mg/dL Final    Triglycerides 08/29/2024 123  0 - 149 mg/dL Final       Age         Desirable   Borderline High   High     Very High   0 D-90 D    19 - 174         ----         ----        ----  91 D- 9 Y     0 -  74        75 -  99     >/= 100      ----    10-19 Y     0 -  89        90 - 129     >/= 130      ----    20-24 Y     0 - 114       115 - 149     >/= 150      ----         >24 Y     0 - 149       150 - 199    200- 499    >/= 500    Venipuncture immediately after or during the administration of Metamizole may lead to falsely low results. Testing should be performed immediately prior to Metamizole dosing.    Non HDL Cholesterol 08/29/2024 152 (H)  0 - 149 mg/dL Final          Age       Desirable   Borderline High   High     Very High     0-19 Y     0 - 119       120 - 144     >/= 145    >/= 160    20-24 Y     0 - 149       150 - 189     >/= 190      ----         >24 Y    30 mg/dL above LDL Cholesterol goal      Glucose 08/29/2024 97  74 - 99 mg/dL Final    Sodium 08/29/2024 138  136 - 145 mmol/L Final    Potassium 08/29/2024 5.0  3.5 - 5.3 mmol/L Final    Chloride 08/29/2024 105  98 - 107 mmol/L Final    Bicarbonate 08/29/2024 25  21 - 32 mmol/L Final    Anion Gap 08/29/2024 13  10 - 20 mmol/L Final    Urea Nitrogen  08/29/2024 10  6 - 23 mg/dL Final    Creatinine 08/29/2024 0.64  0.50 - 1.05 mg/dL Final    eGFR 08/29/2024 >90  >60 mL/min/1.73m*2 Final    Calculations of estimated GFR are performed using the 2021 CKD-EPI Study Refit equation without the race variable for the IDMS-Traceable creatinine methods.  https://jasn.asnjournals.org/content/early/2021/09/22/ASN.9988945110    Calcium 08/29/2024 9.2  8.6 - 10.3 mg/dL Final    Albumin 08/29/2024 4.4  3.4 - 5.0 g/dL Final    Alkaline Phosphatase 08/29/2024 62  33 - 110 U/L Final    Total Protein 08/29/2024 6.7  6.4 - 8.2 g/dL Final    AST 08/29/2024 18  9 - 39 U/L Final    Bilirubin, Total 08/29/2024 1.0  0.0 - 1.2 mg/dL Final    ALT 08/29/2024 16  7 - 45 U/L Final    Patients treated with Sulfasalazine may generate falsely decreased results for ALT.    WBC 08/29/2024 4.9  4.4 - 11.3 x10*3/uL Final    nRBC 08/29/2024 0.0  0.0 - 0.0 /100 WBCs Final    RBC 08/29/2024 4.19  4.00 - 5.20 x10*6/uL Final    Hemoglobin 08/29/2024 13.2  12.0 - 16.0 g/dL Final    Hematocrit 08/29/2024 40.0  36.0 - 46.0 % Final    MCV 08/29/2024 96  80 - 100 fL Final    MCH 08/29/2024 31.5  26.0 - 34.0 pg Final    MCHC 08/29/2024 33.0  32.0 - 36.0 g/dL Final    RDW 08/29/2024 12.2  11.5 - 14.5 % Final    Platelets 08/29/2024 259  150 - 450 x10*3/uL Final    Neutrophils % 08/29/2024 65.4  40.0 - 80.0 % Final    Immature Granulocytes %, Automated 08/29/2024 0.2  0.0 - 0.9 % Final    Immature Granulocyte Count (IG) includes promyelocytes, myelocytes and metamyelocytes but does not include bands. Percent differential counts (%) should be interpreted in the context of the absolute cell counts (cells/UL).    Lymphocytes % 08/29/2024 24.7  13.0 - 44.0 % Final    Monocytes % 08/29/2024 6.9  2.0 - 10.0 % Final    Eosinophils % 08/29/2024 2.2  0.0 - 6.0 % Final    Basophils % 08/29/2024 0.6  0.0 - 2.0 % Final    Neutrophils Absolute 08/29/2024 3.20  1.20 - 7.70 x10*3/uL Final    Percent differential counts (%)  should be interpreted in the context of the absolute cell counts (cells/uL).    Immature Granulocytes Absolute, Au* 08/29/2024 0.01  0.00 - 0.70 x10*3/uL Final    Lymphocytes Absolute 08/29/2024 1.21  1.20 - 4.80 x10*3/uL Final    Monocytes Absolute 08/29/2024 0.34  0.10 - 1.00 x10*3/uL Final    Eosinophils Absolute 08/29/2024 0.11  0.00 - 0.70 x10*3/uL Final    Basophils Absolute 08/29/2024 0.03  0.00 - 0.10 x10*3/uL Final    Hemoglobin A1C 08/29/2024 4.6  see below % Final    Estimated Average Glucose 08/29/2024 85  Not Established mg/dL Final    HIV 1/2 Antigen/Antibody Screen wi* 08/29/2024 Nonreactive  Nonreactive Final       Physical Exam  CONSTITUTIONAL - well nourished, well developed, looks like stated age, in no acute distress, not ill-appearing, and not tired appearing  SKIN - normal skin color and pigmentation, normal skin turgor without rash, lesions, or nodules visualized  HEAD - no trauma, normocephalic  EYES - normal external exam  LUNG - clear to auscultation, no wheezing, no crackles and no rales, good effort  CARDIAC - regular rate and regular rhythm, no skipped beats, no murmur  ABDOMEN - no organomegaly, soft, nontender, nondistended, normal bowel sounds  EXTREMITIES - no edema, no deformities  NEUROLOGICAL - normal balance, normal motor, no ataxia  PSYCHIATRIC - alert, pleasant and cordial, age-appropriate      Assessment/Plan   Depression  A prescription for Wellbutrin 150 mg has been sent to your pharmacy.  Discussed benefits versus side effect such as increased anxiety, and insomnia.  Patient does not have any history of eating disorders or seizures.  Advised patient to follow-up in 6 weeks to further evaluate her symptoms as well as discuss the efficacy and tolerability of this medication.    If patient does develop anxiety on this medication and her symptoms do not feel well-controlled we can consider adding an SSRI in addition to his Wellbutrin.    2. Hair Loss/rosacea/vitamin D  deficiency  Requisition for blood work has been placed, we will order a TSH, CBC, CMP, vitamin D, zinc, ferritin to evaluate for hair loss.  We will notify you of results and treatment recommendations accordingly.  We will also increase your spironolactone from 50 mg to 100 mg once daily so that in addition to treating for rosacea it will also help with your hair loss.  In addition we also discussed topical versus oral minoxidil as well as Nutrofol for hair loss which we may consider at patient's follow-up appointment.    3. Hyperlipidemia  Continue with diet and exercise  We will recheck your lipid panel and A1c.     4.  Colon cancer screening.  Colonoscopy has been ordered, please get to this at your earliest mutual convenience.  Will notify results and treatment recommendations accordingly.

## 2025-04-10 LAB
25(OH)D3+25(OH)D2 SERPL-MCNC: 29 NG/ML (ref 30–100)
ALBUMIN SERPL-MCNC: 5.1 G/DL (ref 3.6–5.1)
ALP SERPL-CCNC: 63 U/L (ref 31–125)
ALT SERPL-CCNC: 14 U/L (ref 6–29)
ANION GAP SERPL CALCULATED.4IONS-SCNC: 9 MMOL/L (CALC) (ref 7–17)
AST SERPL-CCNC: 18 U/L (ref 10–35)
BASOPHILS # BLD AUTO: 41 CELLS/UL (ref 0–200)
BASOPHILS NFR BLD AUTO: 0.8 %
BILIRUB SERPL-MCNC: 1.9 MG/DL (ref 0.2–1.2)
BUN SERPL-MCNC: 12 MG/DL (ref 7–25)
CALCIUM SERPL-MCNC: 10.1 MG/DL (ref 8.6–10.2)
CHLORIDE SERPL-SCNC: 102 MMOL/L (ref 98–110)
CHOLEST SERPL-MCNC: 227 MG/DL
CHOLEST/HDLC SERPL: 3.3 (CALC)
CO2 SERPL-SCNC: 26 MMOL/L (ref 20–32)
CREAT SERPL-MCNC: 0.76 MG/DL (ref 0.5–0.99)
EGFRCR SERPLBLD CKD-EPI 2021: 98 ML/MIN/1.73M2
EOSINOPHIL # BLD AUTO: 92 CELLS/UL (ref 15–500)
EOSINOPHIL NFR BLD AUTO: 1.8 %
ERYTHROCYTE [DISTWIDTH] IN BLOOD BY AUTOMATED COUNT: 12.5 % (ref 11–15)
EST. AVERAGE GLUCOSE BLD GHB EST-MCNC: 103 MG/DL
EST. AVERAGE GLUCOSE BLD GHB EST-SCNC: 5.7 MMOL/L
FERRITIN SERPL-MCNC: 10 NG/ML (ref 16–232)
GLUCOSE SERPL-MCNC: 98 MG/DL (ref 65–99)
HBA1C MFR BLD: 5.2 % OF TOTAL HGB
HCT VFR BLD AUTO: 41.1 % (ref 35–45)
HDLC SERPL-MCNC: 69 MG/DL
HGB BLD-MCNC: 14 G/DL (ref 11.7–15.5)
LDLC SERPL CALC-MCNC: 136 MG/DL (CALC)
LYMPHOCYTES # BLD AUTO: 1387 CELLS/UL (ref 850–3900)
LYMPHOCYTES NFR BLD AUTO: 27.2 %
MCH RBC QN AUTO: 31.6 PG (ref 27–33)
MCHC RBC AUTO-ENTMCNC: 34.1 G/DL (ref 32–36)
MCV RBC AUTO: 92.8 FL (ref 80–100)
MONOCYTES # BLD AUTO: 362 CELLS/UL (ref 200–950)
MONOCYTES NFR BLD AUTO: 7.1 %
NEUTROPHILS # BLD AUTO: 3218 CELLS/UL (ref 1500–7800)
NEUTROPHILS NFR BLD AUTO: 63.1 %
NONHDLC SERPL-MCNC: 158 MG/DL (CALC)
PLATELET # BLD AUTO: 305 THOUSAND/UL (ref 140–400)
PMV BLD REES-ECKER: 10.1 FL (ref 7.5–12.5)
POTASSIUM SERPL-SCNC: 4.8 MMOL/L (ref 3.5–5.3)
PROT SERPL-MCNC: 7.6 G/DL (ref 6.1–8.1)
RBC # BLD AUTO: 4.43 MILLION/UL (ref 3.8–5.1)
SODIUM SERPL-SCNC: 137 MMOL/L (ref 135–146)
TRIGL SERPL-MCNC: 113 MG/DL
TSH SERPL-ACNC: 1.94 MIU/L
WBC # BLD AUTO: 5.1 THOUSAND/UL (ref 3.8–10.8)
ZINC SERPL-MCNC: 79 MCG/DL (ref 60–130)

## 2025-04-26 DIAGNOSIS — B00.9 HSV-2 INFECTION: ICD-10-CM

## 2025-04-28 RX ORDER — VALACYCLOVIR HYDROCHLORIDE 500 MG/1
500 TABLET, FILM COATED ORAL DAILY
Qty: 90 TABLET | Refills: 0 | Status: SHIPPED | OUTPATIENT
Start: 2025-04-28

## 2025-05-06 ENCOUNTER — APPOINTMENT (OUTPATIENT)
Dept: PRIMARY CARE | Facility: CLINIC | Age: 45
End: 2025-05-06
Payer: COMMERCIAL

## 2025-05-06 VITALS
DIASTOLIC BLOOD PRESSURE: 72 MMHG | WEIGHT: 168.8 LBS | HEART RATE: 94 BPM | SYSTOLIC BLOOD PRESSURE: 108 MMHG | BODY MASS INDEX: 25 KG/M2 | HEIGHT: 69 IN | OXYGEN SATURATION: 98 %

## 2025-05-06 DIAGNOSIS — L21.0 DANDRUFF IN ADULT: Primary | ICD-10-CM

## 2025-05-06 DIAGNOSIS — L71.9 ROSACEA: ICD-10-CM

## 2025-05-06 DIAGNOSIS — L65.9 HAIR LOSS: ICD-10-CM

## 2025-05-06 DIAGNOSIS — B00.9 HSV-2 INFECTION: ICD-10-CM

## 2025-05-06 DIAGNOSIS — F32.A DEPRESSION, UNSPECIFIED DEPRESSION TYPE: ICD-10-CM

## 2025-05-06 PROCEDURE — 99214 OFFICE O/P EST MOD 30 MIN: CPT | Performed by: STUDENT IN AN ORGANIZED HEALTH CARE EDUCATION/TRAINING PROGRAM

## 2025-05-06 PROCEDURE — 3008F BODY MASS INDEX DOCD: CPT | Performed by: STUDENT IN AN ORGANIZED HEALTH CARE EDUCATION/TRAINING PROGRAM

## 2025-05-06 PROCEDURE — 1036F TOBACCO NON-USER: CPT | Performed by: STUDENT IN AN ORGANIZED HEALTH CARE EDUCATION/TRAINING PROGRAM

## 2025-05-06 RX ORDER — SPIRONOLACTONE 100 MG/1
100 TABLET, FILM COATED ORAL DAILY
Qty: 90 TABLET | Refills: 1 | Status: SHIPPED | OUTPATIENT
Start: 2025-05-06

## 2025-05-06 RX ORDER — VALACYCLOVIR HYDROCHLORIDE 500 MG/1
500 TABLET, FILM COATED ORAL DAILY
Qty: 90 TABLET | Refills: 3 | Status: SHIPPED | OUTPATIENT
Start: 2025-05-06

## 2025-05-06 RX ORDER — BUPROPION HYDROCHLORIDE 300 MG/1
300 TABLET ORAL EVERY MORNING
Qty: 90 TABLET | Refills: 1 | Status: SHIPPED | OUTPATIENT
Start: 2025-05-06

## 2025-05-06 RX ORDER — KETOCONAZOLE 20 MG/ML
SHAMPOO, SUSPENSION TOPICAL 2 TIMES WEEKLY
Qty: 120 ML | Refills: 1 | Status: SHIPPED | OUTPATIENT
Start: 2025-05-08

## 2025-05-06 NOTE — PROGRESS NOTES
Subjective   Patient ID: France Damico is a 45 y.o. female who presents for follow up   Today she is accompanied by alone.     HPI  Depression  Current medication is buPROPion XL (Wellbutrin XL) 150 mg 24 hr tablet   Has been tolerating the medication well, however does feel like her symptoms are not appropriately controlled.  Continues to feel overwhelmed which is making her emotional bowl and irritable.  Would like to discuss adjusting her medication.  Was last on Zoloft 50mg but discontinued due to decreased libido.   Denies any insomnia, increased anxiety on the current medication.  In addition, does not have any history of seizures or eating disorders.    2. Hair Loss/rosacea/vitamin D deficiency  Patient spironolactone was increased from 50 mg to 100 mg at her last visit, has been tolerating well.  Was previously on spironolactone 50 mcg for her rosacea with improvement, however with increased to 100 mg to help with her hair loss.  Blood work was placed and drawn for TSH, CBC, CMP, vitamin D, zinc, ferritin to evaluate for hair loss; all located within the chart.   Her vitamin D and ferritin levels did come back low, has started taking 50 mcg of vitamin D a day.    3.  HSV infection  Has a history in the past being diagnosed with HSV-2 with some flareups occurring in the past  Usually that this occurred almost monthly ready for her cycle started  Continues now acyclovir daily and denies any recent flareups  Requesting refills       Current Outpatient Medications on File Prior to Visit   Medication Sig Dispense Refill    doxycycline (Periostat) 20 mg tablet Take 1 tablet (20 mg) by mouth once daily. Take with a full glass of water and do not lie down for at least 30 minutes after. 90 tablet 0    terconazole (Terazol 7) 0.4 % vaginal cream USE 1 APPLICATION EVERY DAY AT BEDTIME FOR 7 DAYS.      [DISCONTINUED] buPROPion XL (Wellbutrin XL) 150 mg 24 hr tablet Take 1 tablet (150 mg) by mouth once daily in the  "morning. Do not crush, chew, or split. 30 tablet 1    [DISCONTINUED] spironolactone (Aldactone) 100 mg tablet Take 1 tablet (100 mg) by mouth once daily. 90 tablet 1    [DISCONTINUED] valACYclovir (Valtrex) 500 mg tablet TAKE ONE TABLET BY MOUTH once DAILY 90 tablet 0     No current facility-administered medications on file prior to visit.        No Known Allergies    Immunization History   Administered Date(s) Administered    Flu vaccine (IIV4), preservative free *Check age/dose* 10/14/2016    Hepatitis B vaccine, adult *Check Product/Dose* 07/27/2004, 08/27/2004, 12/27/2004    Influenza, live, intranasal 10/24/2006    MMR vaccine, subcutaneous (MMR II) 04/09/1992, 09/19/2023    Moderna SARS-CoV-2 Vaccination 02/24/2021, 03/22/2021    Novel influenza-H1N1-09, preservative-free 12/21/2009    PPD Test 07/27/2004, 08/03/2004    Td vaccine, age 7 years and older (TDVAX) 07/27/2004         Review of Systems  All pertinent positive symptoms are included in the history of present illness.  All other systems have been reviewed and are negative and noncontributory to this patient's current ailments.     Objective   /72 (BP Location: Left arm, Patient Position: Sitting, BP Cuff Size: Adult)   Pulse 94   Ht 1.74 m (5' 8.5\")   Wt 76.6 kg (168 lb 12.8 oz)   SpO2 98%   BMI 25.29 kg/m²   BSA: 1.92 meters squared  No visits with results within 1 Month(s) from this visit.   Latest known visit with results is:   Office Visit on 04/01/2025   Component Date Value Ref Range Status    TSH W/REFLEX TO FT4 04/08/2025 1.94  mIU/L Final    Comment:           Reference Range                         > or = 20 Years  0.40-4.50                              Pregnancy Ranges            First trimester    0.26-2.66            Second trimester   0.55-2.73            Third trimester    0.43-2.91      CHOLESTEROL, TOTAL 04/08/2025 227 (H)  <200 mg/dL Final    HDL CHOLESTEROL 04/08/2025 69  > OR = 50 mg/dL Final    TRIGLYCERIDES 04/08/2025 " 113  <150 mg/dL Final    LDL-CHOLESTEROL 04/08/2025 136 (H)  mg/dL (calc) Final    Comment: Reference range: <100     Desirable range <100 mg/dL for primary prevention;    <70 mg/dL for patients with CHD or diabetic patients   with > or = 2 CHD risk factors.     LDL-C is now calculated using the Carter-Gruber   calculation, which is a validated novel method providing   better accuracy than the Friedewald equation in the   estimation of LDL-C.   Carter RADER et al. MIN. 2013;310(19): 8284-9477   (http://education.Cubeit.fm/faq/TPV605)      CHOL/HDLC RATIO 04/08/2025 3.3  <5.0 (calc) Final    NON HDL CHOLESTEROL 04/08/2025 158 (H)  <130 mg/dL (calc) Final    Comment: For patients with diabetes plus 1 major ASCVD risk   factor, treating to a non-HDL-C goal of <100 mg/dL   (LDL-C of <70 mg/dL) is considered a therapeutic   option.      GLUCOSE 04/08/2025 98  65 - 99 mg/dL Final    Comment:               Fasting reference interval         UREA NITROGEN (BUN) 04/08/2025 12  7 - 25 mg/dL Final    CREATININE 04/08/2025 0.76  0.50 - 0.99 mg/dL Final    EGFR 04/08/2025 98  > OR = 60 mL/min/1.73m2 Final    SODIUM 04/08/2025 137  135 - 146 mmol/L Final    POTASSIUM 04/08/2025 4.8  3.5 - 5.3 mmol/L Final    CHLORIDE 04/08/2025 102  98 - 110 mmol/L Final    CARBON DIOXIDE 04/08/2025 26  20 - 32 mmol/L Final    ELECTROLYTE BALANCE 04/08/2025 9  7 - 17 mmol/L (calc) Final    CALCIUM 04/08/2025 10.1  8.6 - 10.2 mg/dL Final    PROTEIN, TOTAL 04/08/2025 7.6  6.1 - 8.1 g/dL Final    ALBUMIN 04/08/2025 5.1  3.6 - 5.1 g/dL Final    BILIRUBIN, TOTAL 04/08/2025 1.9 (H)  0.2 - 1.2 mg/dL Final    ALKALINE PHOSPHATASE 04/08/2025 63  31 - 125 U/L Final    AST 04/08/2025 18  10 - 35 U/L Final    ALT 04/08/2025 14  6 - 29 U/L Final    WHITE BLOOD CELL COUNT 04/08/2025 5.1  3.8 - 10.8 Thousand/uL Final    RED BLOOD CELL COUNT 04/08/2025 4.43  3.80 - 5.10 Million/uL Final    HEMOGLOBIN 04/08/2025 14.0  11.7 - 15.5 g/dL Final     HEMATOCRIT 04/08/2025 41.1  35.0 - 45.0 % Final    MCV 04/08/2025 92.8  80.0 - 100.0 fL Final    MCH 04/08/2025 31.6  27.0 - 33.0 pg Final    MCHC 04/08/2025 34.1  32.0 - 36.0 g/dL Final    Comment: For adults, a slight decrease in the calculated MCHC  value (in the range of 30 to 32 g/dL) is most likely  not clinically significant; however, it should be  interpreted with caution in correlation with other  red cell parameters and the patient's clinical  condition.      RDW 04/08/2025 12.5  11.0 - 15.0 % Final    PLATELET COUNT 04/08/2025 305  140 - 400 Thousand/uL Final    MPV 04/08/2025 10.1  7.5 - 12.5 fL Final    ABSOLUTE NEUTROPHILS 04/08/2025 3,218  1,500 - 7,800 cells/uL Final    ABSOLUTE LYMPHOCYTES 04/08/2025 1,387  850 - 3,900 cells/uL Final    ABSOLUTE MONOCYTES 04/08/2025 362  200 - 950 cells/uL Final    ABSOLUTE EOSINOPHILS 04/08/2025 92  15 - 500 cells/uL Final    ABSOLUTE BASOPHILS 04/08/2025 41  0 - 200 cells/uL Final    NEUTROPHILS 04/08/2025 63.1  % Final    LYMPHOCYTES 04/08/2025 27.2  % Final    MONOCYTES 04/08/2025 7.1  % Final    EOSINOPHILS 04/08/2025 1.8  % Final    BASOPHILS 04/08/2025 0.8  % Final    HEMOGLOBIN A1c 04/08/2025 5.2  <5.7 % of total Hgb Final    Comment: For the purpose of screening for the presence of  diabetes:     <5.7%       Consistent with the absence of diabetes  5.7-6.4%    Consistent with increased risk for diabetes              (prediabetes)  > or =6.5%  Consistent with diabetes     This assay result is consistent with a decreased risk  of diabetes.     Currently, no consensus exists regarding use of  hemoglobin A1c for diagnosis of diabetes in children.     According to American Diabetes Association (ADA)  guidelines, hemoglobin A1c <7.0% represents optimal  control in non-pregnant diabetic patients. Different  metrics may apply to specific patient populations.   Standards of Medical Care in Diabetes(ADA).          eAG (mg/dL) 04/08/2025 103  mg/dL Final    eAG  (mmol/L) 04/08/2025 5.7  mmol/L Final    VITAMIN D,25-OH,TOTAL,IA 04/08/2025 29 (L)  30 - 100 ng/mL Final    Comment: Vitamin D Status         25-OH Vitamin D:     Deficiency:                    <20 ng/mL  Insufficiency:             20 - 29 ng/mL  Optimal:                 > or = 30 ng/mL     For 25-OH Vitamin D testing on patients on   D2-supplementation and patients for whom quantitation   of D2 and D3 fractions is required, the QuestAssureD(TM)  25-OH VIT D, (D2,D3), LC/MS/MS is recommended: order   code 31832 (patients >2yrs).     See Note 1     Note 1     For additional information, please refer to   http://education.Prowl/faq/UDJ287   (This link is being provided for informational/  educational purposes only.)      ZINC 04/08/2025 79  60 - 130 mcg/dL Final    Comment:    This test was developed and its analytical performance  characteristics have been determined by [employer] [place], [place]. It has  not been cleared or approved by the U.S. Food and Drug  Administration. This assay has been validated pursuant  to the CLIA regulations and is used for clinical  purposes.         FERRITIN 04/08/2025 10 (L)  16 - 232 ng/mL Final       Physical Exam  CONSTITUTIONAL - well nourished, well developed, looks like stated age, in no acute distress, not ill-appearing, and not tired appearing  SKIN - normal skin color and pigmentation, normal skin turgor without rash, lesions, or nodules visualized  HEAD - no trauma, normocephalic  EYES - normal external exam  LUNG - clear to auscultation, no wheezing, no crackles and no rales, good effort  CARDIAC - regular rate and regular rhythm, no skipped beats, no murmur  ABDOMEN - no organomegaly, soft, nontender, nondistended, normal bowel sounds  EXTREMITIES - no edema, no deformities  NEUROLOGICAL - normal balance, normal motor, no ataxia  PSYCHIATRIC - alert, pleasant and cordial, age-appropriate      Assessment/Plan   Depression  We will  increase your Wellbutrin to 300 mg once daily.  A new prescription has been sent to your pharmacy.  Discussed benefits versus side effect such as increased anxiety, and insomnia.  Patient does not have any history of eating disorders or seizures.  Advised patient to follow-up in 6 weeks to further evaluate her symptoms as well as discuss the efficacy and tolerability of this medication.    If patient does develop anxiety/insomnia on this medication and her symptoms do not feel well-controlled on this increased dose we can consider decreasing her Wellbutrin down to 150 and consider adding an SSRI such as Prozac/Lexapro.    2. Hair Loss/rosacea/vitamin D deficiency  Discussed the results of the patient's blood work, advised her to continue supplementing her vitamin D as well as start taking iron supplements.  We will continue her spironolactone 1 mg once daily to treat for both your rosacea and hair loss.  In addition we will also send a prescription for ketoconazole shampoo to help with your scalp health and stimulate some hair growth.    We also discussed Aveda shampoo to help decrease hair loss.  In addition we also discussed topical versus oral minoxidil as well as Nutrofol for hair loss which we may consider at a future time.    3  HSV infection  Stable at this time  We will continue valacyclovir 500 mg   Refills sent to your pharmacy    Follow-up with us in 4 to 6 weeks via message, and in person appointment is not required.

## 2025-08-01 ENCOUNTER — PATIENT MESSAGE (OUTPATIENT)
Dept: PRIMARY CARE | Facility: CLINIC | Age: 45
End: 2025-08-01
Payer: COMMERCIAL

## 2025-08-01 DIAGNOSIS — B00.9 HSV-2 INFECTION: ICD-10-CM

## 2025-08-01 RX ORDER — VALACYCLOVIR HYDROCHLORIDE 500 MG/1
500 TABLET, FILM COATED ORAL DAILY
Qty: 90 TABLET | Refills: 3 | OUTPATIENT
Start: 2025-08-01